# Patient Record
(demographics unavailable — no encounter records)

---

## 2025-05-02 NOTE — PROCEDURE
[de-identified] : Pre-operative Diagnosis: diagnostic, thyroid nodules Post-operative Diagnosis: normal exam Anesthesia: Topical - 1 % Lidocaine/Phenylephrine Procedure:  Flexible Laryngoscopy Procedure Details:   The patient was placed in the sitting position.  After decongestant and anesthesia were applied the laryngoscope was passed.  The nasal cavities, nasopharynx, oropharynx, hypopharynx, and larynx were all examined.  Vocal folds were examined during respiration and phonation.  The following findings were noted:   Findings:  Nose: Septum is midline, turbinates are normal, nasal airways patent, mucosa normal Nasopharynx: Adenoids normal, no masses, eustachian tube normal Oropharynx: Pharyngeal walls symmetric and without lesion. Tonsils/fossae symmetric Hypopharynx: Hypopharynx and pyriform sinuses without lesion. No masses or asymmetry.  No pooling of secretions. Larynx:  Epiglottis and aryepiglottic folds were sharp and crisp bilaterally.  Bilateral false and true vocal folds normal appearance. Bilateral vocal folds fully mobile and symmetric.  Airway was widely patent.  Condition: Stable.  Patient tolerated procedure well. Complications: None

## 2025-05-02 NOTE — HISTORY OF PRESENT ILLNESS
[de-identified] : 5/2/25 68M presenting with bilateral thyroid nodules. Denies any symptoms such as difficulty swallowing, breathing, voice disturbances or hoarseness. Denies family history of thyroid cancer.   US Thyroid (4/29/25) Right lobe:  8x2x7 mm nodule with coarse calcification, TR4 9x6x6 mm isoechoic nodule with rim calcification, TR4 9x8x10 mm heterogenous nodule with coarse calcification, TR4  Left lobe:  10 x 9 x 10 mm solid and cystic nodule, TR2 8 x 6 x 9 mm isoechoic nodule, TR3 7 x 6 x 8 mm isoechoic nodule, TR3   [FreeTextEntry1] : I evaluated a male patient over 55 years old for thyroid nodules. The nodules were incidentally discovered during an ultrasound of the patient's arteries. The patient reports no symptoms related to the thyroid nodules. He denies any voice changes, difficulty eating, or swallowing issues. The patient's medical history is significant for a recent knee replacement surgery performed at [facility] by Dr. Caldwell approximately 3.5 to 4 months ago. The patient reports excellent recovery, stating he was able to go hiking in [place] with his daughter 8 weeks post-surgery, despite medical advice against such activity. He notes some residual stiffness in the knee, particularly when climbing stairs, but overall reports a positive outcome from the surgery. The patient is currently able to flex his knee to approximately 140 degrees and states that at 135 degrees, he can perform most activities, including skiing. He attends physical therapy twice weekly for ongoing rehabilitation. On examination, I performed a neck palpation and laryngoscopy to evaluate the vocal cords and surrounding structures. The examination revealed no abnormalities. The vocal cords demonstrated normal mobility, and there were no concerning features noted in relation to the thyroid nodules. Based on the ultrasound findings, the nodules do not exhibit any high-risk characteristics that would necessitate immediate biopsy. Given the patient's age and gender, there is a slightly elevated risk for thyroid cancer, but the current presentation does not warrant invasive intervention at this time. I have recommended a follow-up ultrasound in six months to monitor for any changes in the nodules' size or characteristics. I advised the patient to report any persistent voice changes lasting more than 3-4 weeks or the development of any neck lumps. In the absence of these symptoms or significant changes on follow-up imaging, we will continue with conservative management and monitoring.  share copy share copy edit edit